# Patient Record
Sex: FEMALE | Race: BLACK OR AFRICAN AMERICAN | URBAN - METROPOLITAN AREA
[De-identification: names, ages, dates, MRNs, and addresses within clinical notes are randomized per-mention and may not be internally consistent; named-entity substitution may affect disease eponyms.]

---

## 2018-10-11 NOTE — PATIENT DISCUSSION
Surgery Counseling:  I have discussed the option of glasses versus cataract surgery versus following, It was explained that when vision no longer meets the patient's visual needs and a new prescription for glasses is not likely to improve the patient's visual symptoms, the option of cataract surgery is a reasonable next step. It was explained that there is no guarantee that removing the cataract will improve their visual symptoms; however, it is believed that the cataract is contributing to the patient's visual impairment and surgery may noticeably improve both the visual and functional status of the patient. After this discussion, the patient desires to proceed with cataract surgery with implantation of an intraocular lens to improve their vision for driving and watching television.

## 2018-10-11 NOTE — PATIENT DISCUSSION
CATARACT, OU - VISUALLY SIGNIFICANT. SCHEDULE PHACO WITH IOL OD SET FOR DISTANCE  FIRST THEN OS SET FOR INTERMEDIATE  IF VISUAL SYMPTOMS PERSIST. THE PATIENT WILL HAVE TO BE OUT OF HER CONTACT LENSES FOR 2 MONTHS PRIOR TO TESTING. GLASSES RX TO BE GIVEN BY [facility] TO FILL IF DESIRES IN THE EVENT PATIENT DOES NOT PROCEED WITH SURGERY.

## 2018-12-05 NOTE — PATIENT DISCUSSION
The patient had a lesion on the right upper eyelid. After informed consent the lesion was anesthetized with local anesthetic, 1% lidocane with epinephrine 1:100,000 units. Sterile technique was used to remove the lesion with Nuno scissors. Antibiotic ointment was used to treat the area where lesion was removed. Lesion was sent to pathology for analysis. The patient was given written post operative wound care instructions and a prescription for antibiotic ointment. The patient was asked to call  within 10 days if they had not been otherwise called by our office with the result of the biopsy.

## 2018-12-05 NOTE — PATIENT DISCUSSION
PHOTOGRAPHS: I have reviewed the external ocular photographs of this patient which show the following: lesion right upper eyelid.

## 2018-12-21 NOTE — PATIENT DISCUSSION
EXPLAINED THAT THE IOL DOES NOT NEED TO BE CHANGED OR REPLACED IN MOST CASES. THE MATERIAL IS THE SAME WITH TORIC IOL VS. STANDARD IOL.

## 2018-12-21 NOTE — PATIENT DISCUSSION
DISCUSSED CORRECTING ASTIGMATISM DURING SURGERY. DISCUSSED THAT IF SHE CHOOSES TO HAVE HER ASTIGMATISM TREATED DURING SURGERY THERE IS AN 80% CHANCE OF HAVING HER ASTIGMATISM REDUCED SIGNIFICANTLY ENOUGH TO NOT NEED ASTIGMATISM CORRECTED AFTER SURGERY.

## 2018-12-21 NOTE — PATIENT DISCUSSION
PATIENT HAS ONLY BEEN WEARING CONTACT LENS IN HER LEFT EYE FOR THE LAST 8 WEEKS. THIS HAS SIMULATED MONOVISION. PATIENT IS STARTING TO ADAPT TO THE MONOVISION.

## 2018-12-21 NOTE — PATIENT DISCUSSION
DISCUSSED PRE AND POST OP DROPS WITH PATIENT AT GREAT LENGTH. PATIENT WANTS INFORMATION ON THE DROPS AND THE MANUFACTURERS. EXPLAINED TO PATIENT THAT SHE CAN HAVE SAMPLES AND TRY THE DROPS AHEAD OF TIME TO RULE OUT ANY ALLERGIES. EXPLAINED WHAT ANESTHESIA IS USED DURING CATARACT SURGERY AND TOPICAL DROPS USED. SHE HAD GALLBLADDER SURGERY IN 2003 WITH A REACTION TO THE ANESTHESIA. I WILL REQUEST THE RECORDS FROM THAT SURGERY. SHE HAS AN ALLERGY TO OPIOIDS.

## 2018-12-21 NOTE — PATIENT DISCUSSION
PATIENT HAS QUESTIONS REGARDING THE MATERIAL THE IOL IS MADE OUT OF AND THE LIKELIHOOD OF BEING ALLERGIC TO THE IMPLANT. I EXPLAINED TO PATIENT THAT IT IS VERY UNLIKELY TO HAVE AN ALLERGY TO THE IOL. THERE HAVE BEEN SOME INSTANCES OF THE A PATIENT HAVING PERSISTENT INFLAMMATION AFTER SURGERY.

## 2018-12-21 NOTE — PATIENT DISCUSSION
PATIENT HAS BEEN WEARING CONTACTS FOR DISTANCE AND WEARING OTC READERS FOR NEAR VISION OVER HER CONTACTS.

## 2018-12-21 NOTE — PATIENT DISCUSSION
REFRACTIVE ERROR - ASTIGMATISM:  PATIENT DESIRES TO HAVE THEIR REFRACTIVE ERROR SURGICALLY CORRECTED WITH  THE TORIC IOL AND FEMTOSECOND LASER.

## 2018-12-21 NOTE — PATIENT DISCUSSION
CATARACT, OU - VISUALLY SIGNIFICANT. SCHEDULE PHACO WITH IOL OD SET FOR DISTANCE FIRST THEN OS AT A LATER TIME AFTER REMOVING CONTACT LENS FOR 8 WEEKS IF VISUAL SYMPTOMS PERSIST. PATIENT STILL UNSURE IF SHE WILL WANT OS SET FOR DISTANCE, NEAR OR INTERMEDIATE VISION.

## 2022-10-17 ENCOUNTER — NEW PATIENT (OUTPATIENT)
Dept: URBAN - METROPOLITAN AREA CLINIC 53 | Facility: CLINIC | Age: 27
End: 2022-10-17

## 2022-10-17 DIAGNOSIS — H52.13: ICD-10-CM

## 2022-10-17 DIAGNOSIS — H16.223: ICD-10-CM

## 2022-10-17 PROCEDURE — 92004 COMPRE OPH EXAM NEW PT 1/>: CPT

## 2022-10-17 PROCEDURE — 92015 DETERMINE REFRACTIVE STATE: CPT

## 2022-10-17 ASSESSMENT — KERATOMETRY
OS_AXISANGLE2_DEGREES: 91
OS_K1POWER_DIOPTERS: 43.00
OS_K2POWER_DIOPTERS: 44.50
OD_AXISANGLE2_DEGREES: 90
OD_K2POWER_DIOPTERS: 44.75
OS_AXISANGLE_DEGREES: 001
OD_K1POWER_DIOPTERS: 42.75
OD_AXISANGLE_DEGREES: 180

## 2022-10-17 ASSESSMENT — VISUAL ACUITY
OD_SC: 20/20
OU_SC: J1+
OU_SC: 20/20
OS_SC: 20/20

## 2022-10-17 ASSESSMENT — TONOMETRY
OS_IOP_MMHG: 15
OD_IOP_MMHG: 15

## 2023-06-08 ENCOUNTER — ESTABLISHED PATIENT (OUTPATIENT)
Dept: URBAN - METROPOLITAN AREA CLINIC 48 | Facility: LOCATION | Age: 28
End: 2023-06-08

## 2023-06-08 DIAGNOSIS — H16.223: ICD-10-CM

## 2023-06-08 DIAGNOSIS — H53.8: ICD-10-CM

## 2023-06-08 PROCEDURE — 92014 COMPRE OPH EXAM EST PT 1/>: CPT

## 2023-06-08 PROCEDURE — 92134 CPTRZ OPH DX IMG PST SGM RTA: CPT

## 2023-06-08 ASSESSMENT — KERATOMETRY
OD_AXISANGLE_DEGREES: 180
OS_AXISANGLE_DEGREES: 001
OD_K2POWER_DIOPTERS: 44.75
OS_K2POWER_DIOPTERS: 44.50
OS_AXISANGLE2_DEGREES: 91
OD_K1POWER_DIOPTERS: 42.75
OS_K1POWER_DIOPTERS: 43.00
OD_AXISANGLE2_DEGREES: 90

## 2023-06-08 ASSESSMENT — TONOMETRY
OD_IOP_MMHG: 18
OS_IOP_MMHG: 18

## 2023-06-08 ASSESSMENT — VISUAL ACUITY
OS_SC: 20/20-2
OD_SC: 20/20

## 2023-07-06 ENCOUNTER — ESTABLISHED PATIENT (OUTPATIENT)
Dept: URBAN - METROPOLITAN AREA CLINIC 48 | Facility: LOCATION | Age: 28
End: 2023-07-06

## 2023-07-06 DIAGNOSIS — H53.8: ICD-10-CM

## 2023-07-06 PROCEDURE — 92134 CPTRZ OPH DX IMG PST SGM RTA: CPT

## 2023-07-06 PROCEDURE — 92014 COMPRE OPH EXAM EST PT 1/>: CPT

## 2023-07-06 ASSESSMENT — TONOMETRY
OD_IOP_MMHG: 16
OS_IOP_MMHG: 16

## 2023-07-06 ASSESSMENT — KERATOMETRY
OS_AXISANGLE2_DEGREES: 91
OD_AXISANGLE2_DEGREES: 90
OD_K2POWER_DIOPTERS: 44.75
OS_K2POWER_DIOPTERS: 44.50
OS_K1POWER_DIOPTERS: 43.00
OD_AXISANGLE_DEGREES: 180
OD_K1POWER_DIOPTERS: 42.75
OS_AXISANGLE_DEGREES: 001

## 2023-07-06 ASSESSMENT — VISUAL ACUITY
OS_CC: 20/20
OD_CC: 20/20

## 2023-10-17 ENCOUNTER — COMPREHENSIVE EXAM (OUTPATIENT)
Dept: URBAN - METROPOLITAN AREA CLINIC 53 | Facility: CLINIC | Age: 28
End: 2023-10-17

## 2023-10-17 DIAGNOSIS — H52.13: ICD-10-CM

## 2023-10-17 DIAGNOSIS — H16.223: ICD-10-CM

## 2023-10-17 DIAGNOSIS — H43.813: ICD-10-CM

## 2023-10-17 PROCEDURE — 92015 DETERMINE REFRACTIVE STATE: CPT | Mod: NC

## 2023-10-17 PROCEDURE — 92014 COMPRE OPH EXAM EST PT 1/>: CPT | Mod: NC

## 2023-10-17 ASSESSMENT — VISUAL ACUITY
OD_CC: 20/20-1
OU_CC: 20/20
OS_CC: 20/20-2
OS_CC: J1+
OD_CC: J1+
OU_CC: J1+

## 2023-10-17 ASSESSMENT — KERATOMETRY
OS_K1POWER_DIOPTERS: 42.75
OS_AXISANGLE2_DEGREES: 91
OD_AXISANGLE_DEGREES: 178
OD_AXISANGLE2_DEGREES: 90
OD_K1POWER_DIOPTERS: 42.75
OS_K2POWER_DIOPTERS: 44.50
OS_K1POWER_DIOPTERS: 43.00
OD_K2POWER_DIOPTERS: 44.75
OD_AXISANGLE_DEGREES: 180
OD_K2POWER_DIOPTERS: 44.50
OS_AXISANGLE_DEGREES: 1
OD_AXISANGLE2_DEGREES: 088
OS_AXISANGLE_DEGREES: 001
OS_AXISANGLE2_DEGREES: 091

## 2023-10-17 ASSESSMENT — TONOMETRY
OS_IOP_MMHG: 15
OD_IOP_MMHG: 15

## 2024-10-24 ENCOUNTER — COMPREHENSIVE EXAM (OUTPATIENT)
Dept: URBAN - METROPOLITAN AREA CLINIC 53 | Facility: CLINIC | Age: 29
End: 2024-10-24

## 2024-10-24 DIAGNOSIS — H52.13: ICD-10-CM

## 2024-10-24 DIAGNOSIS — Z01.00: ICD-10-CM

## 2024-10-24 DIAGNOSIS — H43.813: ICD-10-CM

## 2024-10-24 DIAGNOSIS — H16.223: ICD-10-CM

## 2024-10-24 PROCEDURE — 92014 COMPRE OPH EXAM EST PT 1/>: CPT

## 2024-10-24 PROCEDURE — 92015 DETERMINE REFRACTIVE STATE: CPT
